# Patient Record
Sex: MALE | Race: WHITE | URBAN - METROPOLITAN AREA
[De-identification: names, ages, dates, MRNs, and addresses within clinical notes are randomized per-mention and may not be internally consistent; named-entity substitution may affect disease eponyms.]

---

## 2020-12-02 ENCOUNTER — NEW PATIENT (OUTPATIENT)
Dept: URBAN - METROPOLITAN AREA CLINIC 30 | Facility: CLINIC | Age: 65
End: 2020-12-02
Payer: OTHER GOVERNMENT

## 2020-12-02 DIAGNOSIS — H43.813 VITREOUS DEGENERATION, BILATERAL: ICD-10-CM

## 2020-12-02 DIAGNOSIS — H25.813 COMBINED FORMS OF AGE-RELATED CATARACT, BILATERAL: ICD-10-CM

## 2020-12-02 DIAGNOSIS — H35.371 PUCKERING OF MACULA, RIGHT EYE: Primary | ICD-10-CM

## 2020-12-02 PROCEDURE — 92004 COMPRE OPH EXAM NEW PT 1/>: CPT | Performed by: OPTOMETRIST

## 2020-12-02 PROCEDURE — 92134 CPTRZ OPH DX IMG PST SGM RTA: CPT | Performed by: OPTOMETRIST

## 2020-12-02 ASSESSMENT — KERATOMETRY
OS: 43.58
OD: 43.49

## 2020-12-02 ASSESSMENT — VISUAL ACUITY
OS: 20/20
OD: 20/40

## 2020-12-02 ASSESSMENT — INTRAOCULAR PRESSURE
OD: 16
OS: 16

## 2020-12-21 ENCOUNTER — RX CHECK (OUTPATIENT)
Dept: URBAN - METROPOLITAN AREA CLINIC 30 | Facility: CLINIC | Age: 65
End: 2020-12-21
Payer: OTHER GOVERNMENT

## 2020-12-21 DIAGNOSIS — H52.4 PRESBYOPIA: Primary | ICD-10-CM

## 2020-12-21 PROCEDURE — 92015 DETERMINE REFRACTIVE STATE: CPT | Performed by: OPTOMETRIST

## 2020-12-21 ASSESSMENT — VISUAL ACUITY
OD: 20/40
OS: 20/20

## 2021-02-08 ENCOUNTER — NEW PATIENT (OUTPATIENT)
Dept: URBAN - METROPOLITAN AREA CLINIC 30 | Facility: CLINIC | Age: 66
End: 2021-02-08
Payer: OTHER GOVERNMENT

## 2021-02-08 PROCEDURE — 99204 OFFICE O/P NEW MOD 45 MIN: CPT | Performed by: OPHTHALMOLOGY

## 2021-02-08 PROCEDURE — 92134 CPTRZ OPH DX IMG PST SGM RTA: CPT | Performed by: OPHTHALMOLOGY

## 2021-02-08 ASSESSMENT — INTRAOCULAR PRESSURE
OS: 22
OD: 24

## 2021-08-09 ENCOUNTER — OFFICE VISIT (OUTPATIENT)
Dept: URBAN - METROPOLITAN AREA CLINIC 30 | Facility: CLINIC | Age: 66
End: 2021-08-09
Payer: OTHER GOVERNMENT

## 2021-08-09 PROCEDURE — 92250 FUNDUS PHOTOGRAPHY W/I&R: CPT | Performed by: OPHTHALMOLOGY

## 2021-08-09 PROCEDURE — 92235 FLUORESCEIN ANGRPH MLTIFRAME: CPT | Performed by: OPHTHALMOLOGY

## 2021-08-09 PROCEDURE — 92134 CPTRZ OPH DX IMG PST SGM RTA: CPT | Performed by: OPHTHALMOLOGY

## 2021-08-09 PROCEDURE — 99214 OFFICE O/P EST MOD 30 MIN: CPT | Performed by: OPHTHALMOLOGY

## 2021-08-09 ASSESSMENT — INTRAOCULAR PRESSURE
OD: 18
OS: 19

## 2021-08-09 NOTE — IMPRESSION/PLAN
Impression: Age-related cataract, bilateral Plan: [facility] [place]  Pt understands that cataract will progress after vitrectomy surgery if such is required. Keep f/u w primary eye team PRN.

## 2021-08-09 NOTE — IMPRESSION/PLAN
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 Mild distortion / metam   RIGHT. However, pt still feels my vision is pretty good; symptoms are mild]]. TODAY OBSERVE - retina surgery only if worsening. RTC 12mo FA OS - Optos if avail. 
    OBSERVE (unless progression, consider VIT peel ERM OD w AFx)_

## 2022-03-28 ENCOUNTER — OFFICE VISIT (OUTPATIENT)
Dept: URBAN - METROPOLITAN AREA CLINIC 30 | Facility: CLINIC | Age: 67
End: 2022-03-28
Payer: OTHER GOVERNMENT

## 2022-03-28 DIAGNOSIS — H04.123 TEAR FILM INSUFFICIENCY OF BILATERAL LACRIMAL GLANDS: ICD-10-CM

## 2022-03-28 PROCEDURE — 92014 COMPRE OPH EXAM EST PT 1/>: CPT | Performed by: OPTOMETRIST

## 2022-03-28 PROCEDURE — 92134 CPTRZ OPH DX IMG PST SGM RTA: CPT | Performed by: OPTOMETRIST

## 2022-03-28 ASSESSMENT — INTRAOCULAR PRESSURE
OS: 14
OD: 16

## 2022-03-28 ASSESSMENT — KERATOMETRY
OD: 43.59
OS: 43.58

## 2022-03-28 ASSESSMENT — VISUAL ACUITY
OD: 20/40
OS: 20/20

## 2022-03-28 NOTE — IMPRESSION/PLAN
Impression: Puckering of macula, right eye    ERM OD>OS    Lamellar w CME OD Plan: Lamellar w CME OD pseudohole on MAC OCT. Onset apparently ~2017. Some distortion / metamorphopsia. RIGHT eye. However, pt still feels my vision is pretty good and symptoms are mild.   
  OBSERVE per Dr. Dee Gill (unless progression, consider VIT peel ERM OD w AFx)_

## 2022-03-28 NOTE — IMPRESSION/PLAN
Impression: Tear film insufficiency of bilateral lacrimal glands: H04.123. Plan: Pt flushes c saline noting that he has had past reaction to ATs- discomfort and increased blur. Discussed trying PF ATs and that saline does not provide moisture.

## 2022-03-28 NOTE — IMPRESSION/PLAN
Impression: Combined forms of age-related cataract, bilateral Plan: Pt educated. Monitor. New spec Rx today- understands limits to BCVA OD.

## 2022-05-17 ENCOUNTER — OFFICE VISIT (OUTPATIENT)
Dept: URBAN - METROPOLITAN AREA CLINIC 30 | Facility: CLINIC | Age: 67
End: 2022-05-17
Payer: OTHER GOVERNMENT

## 2022-05-17 DIAGNOSIS — H35.371 PUCKERING OF MACULA, RIGHT EYE: Primary | ICD-10-CM

## 2022-05-17 DIAGNOSIS — H52.4 PRESBYOPIA: ICD-10-CM

## 2022-05-17 PROCEDURE — 99213 OFFICE O/P EST LOW 20 MIN: CPT | Performed by: OPTOMETRIST

## 2022-05-17 ASSESSMENT — VISUAL ACUITY
OD: 20/50
OS: 20/25

## 2022-05-17 ASSESSMENT — KERATOMETRY
OD: 43.60
OS: 43.63

## 2022-05-17 ASSESSMENT — INTRAOCULAR PRESSURE
OD: 15
OS: 15

## 2022-05-17 NOTE — IMPRESSION/PLAN
Impression: Puckering of macula, right eye    ERM OD>OS    Lamellar w CME OD Plan: Lamellar w CME OD pseudohole on MAC OCT. Onset apparently ~2017. Distortion has recently increased OD, though MAC OCT appears generally stable. Pt has checked at home with [employer] grid. (unless progression, consider VIT peel ERM OD w AFx). Pt defers consult to consider tx at this time. Discussed limits to BCVA.

## 2022-08-03 ENCOUNTER — OFFICE VISIT (OUTPATIENT)
Dept: URBAN - METROPOLITAN AREA CLINIC 30 | Facility: CLINIC | Age: 67
End: 2022-08-03

## 2022-08-03 DIAGNOSIS — H52.4 PRESBYOPIA: Primary | ICD-10-CM

## 2022-08-03 PROCEDURE — V2799 MISC VISION ITEM OR SERVICE: HCPCS | Performed by: OPTOMETRIST

## 2022-08-03 ASSESSMENT — KERATOMETRY
OD: 43.55
OS: 43.63

## 2022-08-03 ASSESSMENT — VISUAL ACUITY
OS: 20/20
OD: 20/40

## 2022-08-03 NOTE — IMPRESSION/PLAN
Impression: Presbyopia: H52.4. Plan: Remake issued- did not like cyl correction, did like less minus OS in office.

## 2023-03-30 ENCOUNTER — OFFICE VISIT (OUTPATIENT)
Dept: URBAN - METROPOLITAN AREA CLINIC 30 | Facility: CLINIC | Age: 68
End: 2023-03-30
Payer: MEDICARE

## 2023-03-30 DIAGNOSIS — H25.813 COMBINED FORMS OF AGE-RELATED CATARACT, BILATERAL: ICD-10-CM

## 2023-03-30 DIAGNOSIS — H35.371 PUCKERING OF MACULA, RIGHT EYE: Primary | ICD-10-CM

## 2023-03-30 DIAGNOSIS — H04.123 TEAR FILM INSUFFICIENCY OF BILATERAL LACRIMAL GLANDS: ICD-10-CM

## 2023-03-30 PROCEDURE — 92134 CPTRZ OPH DX IMG PST SGM RTA: CPT | Performed by: OPTOMETRIST

## 2023-03-30 PROCEDURE — 92014 COMPRE OPH EXAM EST PT 1/>: CPT | Performed by: OPTOMETRIST

## 2023-03-30 ASSESSMENT — KERATOMETRY
OD: 43.50
OS: 43.63

## 2023-03-30 ASSESSMENT — INTRAOCULAR PRESSURE
OS: 16
OD: 16

## 2023-03-30 ASSESSMENT — VISUAL ACUITY
OD: 20/50
OS: 20/25

## 2023-03-30 NOTE — IMPRESSION/PLAN
Impression: Combined forms of age-related cataract, bilateral Plan:  Discussed cataract diagnosis with the patient. Cataract surgery not required.

## 2023-03-30 NOTE — IMPRESSION/PLAN
Impression: Puckering of macula, right eye    ERM OD>OS    Lamellar w CME OD Plan: Lamellar OD c pseudohole on MAC OCT. Onset apparently ~2017. MAC OCT appears generally stable. Pt has checked at home with [employer] grid. (unless progression, consider VIT peel ERM OD w AFx per Dr Valentín Garcia). Pt has deferred consult to consider tx. Discussed limits to BCVA.

## 2023-03-30 NOTE — IMPRESSION/PLAN
Impression: Tear film insufficiency of bilateral lacrimal glands: H04.123. Plan: Pt flushes c saline noting that he has had past reaction to ATs- discomfort and increased blur. Again, discussed trying PF ATs and that saline does not provide moisture.  Sample Systane Complete PF.

## 2024-03-29 ENCOUNTER — OFFICE VISIT (OUTPATIENT)
Dept: URBAN - METROPOLITAN AREA CLINIC 30 | Facility: CLINIC | Age: 69
End: 2024-03-29
Payer: MEDICARE

## 2024-03-29 DIAGNOSIS — H25.813 COMBINED FORMS OF AGE-RELATED CATARACT, BILATERAL: ICD-10-CM

## 2024-03-29 DIAGNOSIS — H52.4 PRESBYOPIA: ICD-10-CM

## 2024-03-29 DIAGNOSIS — H35.371 PUCKERING OF MACULA, RIGHT EYE: Primary | ICD-10-CM

## 2024-03-29 DIAGNOSIS — H43.813 VITREOUS DEGENERATION, BILATERAL: ICD-10-CM

## 2024-03-29 PROCEDURE — 92014 COMPRE OPH EXAM EST PT 1/>: CPT | Performed by: OPTOMETRIST

## 2024-03-29 PROCEDURE — 92134 CPTRZ OPH DX IMG PST SGM RTA: CPT | Performed by: OPTOMETRIST

## 2024-03-29 ASSESSMENT — KERATOMETRY
OS: 43.58
OD: 43.58

## 2024-03-29 ASSESSMENT — VISUAL ACUITY
OD: 20/40
OS: 20/20

## 2024-03-29 ASSESSMENT — INTRAOCULAR PRESSURE
OD: 15
OS: 15

## 2025-04-03 ENCOUNTER — OFFICE VISIT (OUTPATIENT)
Dept: URBAN - METROPOLITAN AREA CLINIC 30 | Facility: CLINIC | Age: 70
End: 2025-04-03
Payer: MEDICARE

## 2025-04-03 DIAGNOSIS — H35.371 PUCKERING OF MACULA, RIGHT EYE: Primary | ICD-10-CM

## 2025-04-03 DIAGNOSIS — H25.813 COMBINED FORMS OF AGE-RELATED CATARACT, BILATERAL: ICD-10-CM

## 2025-04-03 DIAGNOSIS — H04.123 TEAR FILM INSUFFICIENCY OF BILATERAL LACRIMAL GLANDS: ICD-10-CM

## 2025-04-03 DIAGNOSIS — H43.813 VITREOUS DEGENERATION, BILATERAL: ICD-10-CM

## 2025-04-03 PROCEDURE — 92134 CPTRZ OPH DX IMG PST SGM RTA: CPT | Performed by: OPTOMETRIST

## 2025-04-03 PROCEDURE — 92014 COMPRE OPH EXAM EST PT 1/>: CPT | Performed by: OPTOMETRIST

## 2025-04-03 ASSESSMENT — INTRAOCULAR PRESSURE
OD: 14
OS: 13

## 2025-04-03 ASSESSMENT — KERATOMETRY
OS: 43.38
OD: 43.63

## 2025-04-03 ASSESSMENT — VISUAL ACUITY
OD: 20/30
OS: 20/20